# Patient Record
Sex: FEMALE | Race: WHITE | NOT HISPANIC OR LATINO
[De-identification: names, ages, dates, MRNs, and addresses within clinical notes are randomized per-mention and may not be internally consistent; named-entity substitution may affect disease eponyms.]

---

## 2023-08-30 ENCOUNTER — APPOINTMENT (OUTPATIENT)
Dept: GASTROENTEROLOGY | Facility: CLINIC | Age: 33
End: 2023-08-30
Payer: COMMERCIAL

## 2023-08-30 ENCOUNTER — LABORATORY RESULT (OUTPATIENT)
Age: 33
End: 2023-08-30

## 2023-08-30 VITALS
OXYGEN SATURATION: 98 % | WEIGHT: 135 LBS | HEART RATE: 85 BPM | TEMPERATURE: 97.8 F | HEIGHT: 62 IN | DIASTOLIC BLOOD PRESSURE: 73 MMHG | SYSTOLIC BLOOD PRESSURE: 111 MMHG | BODY MASS INDEX: 24.84 KG/M2

## 2023-08-30 DIAGNOSIS — R14.3 FLATULENCE: ICD-10-CM

## 2023-08-30 DIAGNOSIS — R14.0 ABDOMINAL DISTENSION (GASEOUS): ICD-10-CM

## 2023-08-30 PROCEDURE — 99204 OFFICE O/P NEW MOD 45 MIN: CPT

## 2023-08-30 RX ORDER — MONTELUKAST 10 MG/1
10 TABLET, FILM COATED ORAL
Refills: 0 | Status: ACTIVE | COMMUNITY

## 2023-08-30 RX ORDER — NORETHINDRONE ACETATE 5 MG/1
5 TABLET ORAL
Refills: 0 | Status: ACTIVE | COMMUNITY

## 2023-08-30 RX ORDER — ALBUTEROL SULFATE 90 UG/1
108 (90 BASE) INHALANT RESPIRATORY (INHALATION)
Refills: 0 | Status: ACTIVE | COMMUNITY

## 2023-08-30 RX ORDER — FLUTICASONE PROPIONATE 50 UG/1
SPRAY, METERED NASAL
Refills: 0 | Status: ACTIVE | COMMUNITY

## 2023-08-30 RX ORDER — SERTRALINE HYDROCHLORIDE 150 MG/1
150 CAPSULE ORAL
Refills: 0 | Status: ACTIVE | COMMUNITY

## 2023-08-30 NOTE — PHYSICAL EXAM
[Bowel Sounds] : normal bowel sounds [Abdomen Tenderness] : non-tender [Abnormal Walk] : normal gait [Normal Color / Pigmentation] : normal skin color and pigmentation [] : no rash [No Focal Deficits] : no focal deficits [Normal Turgor] : normal skin turgor [Normal] : oriented to person, place, and time [Oriented To Time, Place, And Person] : oriented to person, place, and time [Normal Affect] : the affect was normal [Normal Mood] : the mood was normal

## 2023-08-31 LAB
25(OH)D3 SERPL-MCNC: 25.3 NG/ML
CRP SERPL-MCNC: <3 MG/L
FERRITIN SERPL-MCNC: 38 NG/ML
FOLATE SERPL-MCNC: >20 NG/ML
IGA SER QL IEP: 5 MG/DL
IRON SATN MFR SERPL: 29 %
IRON SERPL-MCNC: 104 UG/DL
TIBC SERPL-MCNC: 354 UG/DL
UIBC SERPL-MCNC: 250 UG/DL
VIT B12 SERPL-MCNC: 393 PG/ML

## 2023-09-05 NOTE — HISTORY OF PRESENT ILLNESS
[FreeTextEntry1] : 32F with PMHx asthma and anxiety referred by acquaintance who is patient at GI office for evaluation of abdominal bloating and gas.  HPI- Pt always had "not a great stomach" ~ 1 year ago began to have bloating after everything eats and really bad smelling gas BMs tend to be more so diarrhea once per week. never blood in stool. has seen little drop of blood on toilet paper after having multiple BMs one day.  denies weight loss, feels like stomach is always distended.  can not think of what caused symptoms to come on 1 year ago   Piatt stool chart: Type 4 or 5   #1 symptom: gas, sulfur/rotten egg smell. needs to avoid kale/broccoli. feels ok with English muffin.   EIMs: hair breakage   PMHx- asthma, allergies, environment, anxiety  PSHx- none  Rx- see chart  Supplements/herbs/OTC- probiotics, magnesium glycinate  A/c or NSAIDs- ibuprofen  FHx- father "stents in stomach arteries" has IBS and mesenteric ischemia.  Allergies: sulfa drugs  ETOH- 4-5 weekends  Smoking- none  Drugs- none   Labs/stool tests- March 2023 with Dr. Willard: TSH wnl, CBC wnl CMP wnl  Breath tests- none Imaging- none  EGD- never  Colonoscopy- [facility] had colonoscopy for stomach pain   Exposure to antibiotics, NSAIDs, PPI-  none  GI infections-  none

## 2023-09-05 NOTE — ASSESSMENT
[FreeTextEntry1] : 32F with PMHx asthma and anxiety referred by acquaintance who is patient at GI office for evaluation of abdominal bloating and gas.  Abdominal bloating/gas - labs/stool tests  - abdominal ultrasound  - [facility] to evaluate for SIBO   f/u with results

## 2023-09-06 ENCOUNTER — TRANSCRIPTION ENCOUNTER (OUTPATIENT)
Age: 33
End: 2023-09-06

## 2023-09-06 ENCOUNTER — NON-APPOINTMENT (OUTPATIENT)
Age: 33
End: 2023-09-06

## 2023-09-06 LAB
BARLEY IGE QN: <0.1 KUA/L
CHERRY IGE QN: 0.13 KUA/L
COW MILK IGE QN: 0.77 KUA/L
CRAB IGE QN: <0.1 KUA/L
DEPRECATED BARLEY IGE RAST QL: 0
DEPRECATED CHERRY IGE RAST QL: NORMAL
DEPRECATED COW MILK IGE RAST QL: 2
DEPRECATED CRAB IGE RAST QL: 0
DEPRECATED EGG WHITE IGE RAST QL: 0
DEPRECATED OAT IGE RAST QL: 0
DEPRECATED PEANUT IGE RAST QL: 0
DEPRECATED RYE IGE RAST QL: 0
DEPRECATED SOYBEAN IGE RAST QL: 0
DEPRECATED WHEAT IGE RAST QL: 0
EGG WHITE IGE QN: <0.1 KUA/L
GLIADIN IGA SER QL: <5 UNITS
GLIADIN IGG SER QL: <5 UNITS
GLIADIN PEPTIDE IGA SER-ACNC: NEGATIVE
GLIADIN PEPTIDE IGG SER-ACNC: NEGATIVE
OAT IGE QN: <0.1 KUA/L
PEANUT IGE QN: <0.1 KUA/L
RYE IGE QN: <0.1 KUA/L
SOYBEAN IGE QN: <0.1 KUA/L
TOTAL IGE SMQN RAST: 424 KU/L
TTG IGA SER IA-ACNC: <1.2 U/ML
TTG IGA SER-ACNC: NEGATIVE
TTG IGG SER IA-ACNC: <1.2 U/ML
TTG IGG SER IA-ACNC: NEGATIVE
WHEAT IGE QN: <0.1 KUA/L

## 2023-09-07 ENCOUNTER — TRANSCRIPTION ENCOUNTER (OUTPATIENT)
Age: 33
End: 2023-09-07

## 2023-09-08 ENCOUNTER — TRANSCRIPTION ENCOUNTER (OUTPATIENT)
Age: 33
End: 2023-09-08

## 2023-09-11 ENCOUNTER — TRANSCRIPTION ENCOUNTER (OUTPATIENT)
Age: 33
End: 2023-09-11

## 2023-09-25 ENCOUNTER — TRANSCRIPTION ENCOUNTER (OUTPATIENT)
Age: 33
End: 2023-09-25

## 2023-09-25 RX ORDER — LACTULOSE 10 G/15ML
10 SOLUTION ORAL
Qty: 1 | Refills: 0 | Status: ACTIVE | COMMUNITY
Start: 2023-09-11 | End: 1900-01-01

## 2023-10-16 ENCOUNTER — TRANSCRIPTION ENCOUNTER (OUTPATIENT)
Age: 33
End: 2023-10-16

## 2023-10-24 ENCOUNTER — TRANSCRIPTION ENCOUNTER (OUTPATIENT)
Age: 33
End: 2023-10-24

## 2023-10-25 ENCOUNTER — TRANSCRIPTION ENCOUNTER (OUTPATIENT)
Age: 33
End: 2023-10-25

## 2023-10-30 ENCOUNTER — TRANSCRIPTION ENCOUNTER (OUTPATIENT)
Age: 33
End: 2023-10-30

## 2023-10-30 LAB — CALPROTECTIN FECAL: 65 UG/G

## 2023-10-31 ENCOUNTER — TRANSCRIPTION ENCOUNTER (OUTPATIENT)
Age: 33
End: 2023-10-31

## 2023-11-07 ENCOUNTER — TRANSCRIPTION ENCOUNTER (OUTPATIENT)
Age: 33
End: 2023-11-07

## 2023-11-08 ENCOUNTER — TRANSCRIPTION ENCOUNTER (OUTPATIENT)
Age: 33
End: 2023-11-08

## 2023-11-13 ENCOUNTER — TRANSCRIPTION ENCOUNTER (OUTPATIENT)
Age: 33
End: 2023-11-13

## 2023-11-14 ENCOUNTER — TRANSCRIPTION ENCOUNTER (OUTPATIENT)
Age: 33
End: 2023-11-14

## 2023-11-14 ENCOUNTER — LABORATORY RESULT (OUTPATIENT)
Age: 33
End: 2023-11-14

## 2023-11-15 ENCOUNTER — TRANSCRIPTION ENCOUNTER (OUTPATIENT)
Age: 33
End: 2023-11-15

## 2023-11-15 LAB — IGA SER QL IEP: 21 MG/DL

## 2023-11-16 ENCOUNTER — TRANSCRIPTION ENCOUNTER (OUTPATIENT)
Age: 33
End: 2023-11-16

## 2023-11-17 LAB
ALMOND IGE QN: <0.1 KUA/L
BRAZIL NUT IGE QN: <0.1 KUA/L
CASHEW NUT IGE QN: <0.1 KUA/L
CODFISH IGE QN: <0.1 KUA/L
COW MILK IGE QN: 0.8 KUA/L
DEPRECATED ALMOND IGE RAST QL: 0
DEPRECATED BRAZIL NUT IGE RAST QL: 0
DEPRECATED CASHEW NUT IGE RAST QL: 0
DEPRECATED CODFISH IGE RAST QL: 0
DEPRECATED COW MILK IGE RAST QL: 2
DEPRECATED EGG WHITE IGE RAST QL: 0
DEPRECATED HAZELNUT IGE RAST QL: 1
DEPRECATED PEANUT IGE RAST QL: 0
DEPRECATED SALMON IGE RAST QL: 0
DEPRECATED SCALLOP IGE RAST QL: <0.1 KUA/L
DEPRECATED SESAME SEED IGE RAST QL: 0
DEPRECATED SHRIMP IGE RAST QL: 2
DEPRECATED SOYBEAN IGE RAST QL: 0
DEPRECATED TUNA IGE RAST QL: 0
DEPRECATED WALNUT IGE RAST QL: 0
DEPRECATED WHEAT IGE RAST QL: 0
EGG WHITE IGE QN: <0.1 KUA/L
HAZELNUT IGE QN: 0.45 KUA/L
PEANUT IGE QN: <0.1 KUA/L
SALMON IGE QN: <0.1 KUA/L
SCALLOP IGE QN: 0
SCALLOP IGE QN: 0.8 KUA/L
SESAME SEED IGE QN: <0.1 KUA/L
SOYBEAN IGE QN: <0.1 KUA/L
TOTAL IGE SMQN RAST: 548 KU/L
TUNA IGE QN: <0.1 KUA/L
WALNUT IGE QN: <0.1 KUA/L
WHEAT IGE QN: <0.1 KUA/L

## 2023-12-21 ENCOUNTER — TRANSCRIPTION ENCOUNTER (OUTPATIENT)
Age: 33
End: 2023-12-21

## 2024-01-29 ENCOUNTER — TRANSCRIPTION ENCOUNTER (OUTPATIENT)
Age: 34
End: 2024-01-29

## 2024-02-14 ENCOUNTER — TRANSCRIPTION ENCOUNTER (OUTPATIENT)
Age: 34
End: 2024-02-14

## 2024-02-15 ENCOUNTER — TRANSCRIPTION ENCOUNTER (OUTPATIENT)
Age: 34
End: 2024-02-15

## 2024-02-26 ENCOUNTER — TRANSCRIPTION ENCOUNTER (OUTPATIENT)
Age: 34
End: 2024-02-26

## 2024-02-27 ENCOUNTER — TRANSCRIPTION ENCOUNTER (OUTPATIENT)
Age: 34
End: 2024-02-27

## 2024-03-18 ENCOUNTER — TRANSCRIPTION ENCOUNTER (OUTPATIENT)
Age: 34
End: 2024-03-18

## 2024-03-29 ENCOUNTER — TRANSCRIPTION ENCOUNTER (OUTPATIENT)
Age: 34
End: 2024-03-29

## 2024-05-16 ENCOUNTER — LABORATORY RESULT (OUTPATIENT)
Age: 34
End: 2024-05-16

## 2024-05-20 ENCOUNTER — TRANSCRIPTION ENCOUNTER (OUTPATIENT)
Age: 34
End: 2024-05-20

## 2024-05-21 ENCOUNTER — TRANSCRIPTION ENCOUNTER (OUTPATIENT)
Age: 34
End: 2024-05-21

## 2024-05-21 DIAGNOSIS — K58.0 IRRITABLE BOWEL SYNDROME WITH DIARRHEA: ICD-10-CM

## 2024-05-21 RX ORDER — RIFAXIMIN 550 MG/1
550 TABLET ORAL
Qty: 42 | Refills: 0 | Status: ACTIVE | COMMUNITY
Start: 2024-05-21 | End: 1900-01-01

## 2024-05-23 ENCOUNTER — TRANSCRIPTION ENCOUNTER (OUTPATIENT)
Age: 34
End: 2024-05-23